# Patient Record
Sex: FEMALE | Race: BLACK OR AFRICAN AMERICAN
[De-identification: names, ages, dates, MRNs, and addresses within clinical notes are randomized per-mention and may not be internally consistent; named-entity substitution may affect disease eponyms.]

---

## 2017-01-18 ENCOUNTER — APPOINTMENT (OUTPATIENT)
Dept: NEPHROLOGY | Facility: CLINIC | Age: 77
End: 2017-01-18

## 2017-03-28 ENCOUNTER — APPOINTMENT (OUTPATIENT)
Dept: NEPHROLOGY | Facility: CLINIC | Age: 77
End: 2017-03-28

## 2017-11-13 ENCOUNTER — RESULT CHARGE (OUTPATIENT)
Age: 77
End: 2017-11-13

## 2017-11-14 ENCOUNTER — APPOINTMENT (OUTPATIENT)
Dept: HEART AND VASCULAR | Facility: CLINIC | Age: 77
End: 2017-11-14
Payer: MEDICARE

## 2017-11-14 VITALS
BODY MASS INDEX: 25.95 KG/M2 | WEIGHT: 151.99 LBS | OXYGEN SATURATION: 93 % | DIASTOLIC BLOOD PRESSURE: 70 MMHG | HEART RATE: 58 BPM | SYSTOLIC BLOOD PRESSURE: 128 MMHG | HEIGHT: 64 IN | TEMPERATURE: 97.6 F

## 2017-11-14 DIAGNOSIS — K21.9 GASTRO-ESOPHAGEAL REFLUX DISEASE W/OUT ESOPHAGITIS: ICD-10-CM

## 2017-11-14 PROCEDURE — 93000 ELECTROCARDIOGRAM COMPLETE: CPT

## 2017-11-14 PROCEDURE — 99214 OFFICE O/P EST MOD 30 MIN: CPT | Mod: 25

## 2017-11-15 ENCOUNTER — RX RENEWAL (OUTPATIENT)
Age: 77
End: 2017-11-15

## 2017-11-15 RX ORDER — ASPIRIN ENTERIC COATED TABLETS 81 MG 81 MG/1
81 TABLET, DELAYED RELEASE ORAL DAILY
Qty: 90 | Refills: 1 | Status: ACTIVE | COMMUNITY
Start: 2017-11-15 | End: 1900-01-01

## 2017-11-27 ENCOUNTER — RX RENEWAL (OUTPATIENT)
Age: 77
End: 2017-11-27

## 2017-12-15 ENCOUNTER — RX RENEWAL (OUTPATIENT)
Age: 77
End: 2017-12-15

## 2018-07-12 ENCOUNTER — APPOINTMENT (OUTPATIENT)
Dept: HEART AND VASCULAR | Facility: CLINIC | Age: 78
End: 2018-07-12

## 2018-07-19 ENCOUNTER — RX RENEWAL (OUTPATIENT)
Age: 78
End: 2018-07-19

## 2018-09-07 ENCOUNTER — RX RENEWAL (OUTPATIENT)
Age: 78
End: 2018-09-07

## 2018-11-01 ENCOUNTER — APPOINTMENT (OUTPATIENT)
Dept: HEART AND VASCULAR | Facility: CLINIC | Age: 78
End: 2018-11-01
Payer: MEDICARE

## 2018-11-01 VITALS
WEIGHT: 148 LBS | SYSTOLIC BLOOD PRESSURE: 140 MMHG | DIASTOLIC BLOOD PRESSURE: 70 MMHG | HEART RATE: 61 BPM | HEIGHT: 64 IN | OXYGEN SATURATION: 98 % | TEMPERATURE: 97.7 F | BODY MASS INDEX: 25.27 KG/M2

## 2018-11-01 PROCEDURE — 36415 COLL VENOUS BLD VENIPUNCTURE: CPT

## 2018-11-01 PROCEDURE — 93000 ELECTROCARDIOGRAM COMPLETE: CPT

## 2018-11-01 PROCEDURE — 99214 OFFICE O/P EST MOD 30 MIN: CPT

## 2018-11-02 LAB
ALBUMIN SERPL ELPH-MCNC: 4.4 G/DL
ALP BLD-CCNC: 64 U/L
ALT SERPL-CCNC: 14 U/L
ANION GAP SERPL CALC-SCNC: 14 MMOL/L
AST SERPL-CCNC: 22 U/L
BASOPHILS # BLD AUTO: 0.01 K/UL
BASOPHILS NFR BLD AUTO: 0.2 %
BILIRUB SERPL-MCNC: 0.4 MG/DL
BUN SERPL-MCNC: 34 MG/DL
CALCIUM SERPL-MCNC: 10.1 MG/DL
CHLORIDE SERPL-SCNC: 103 MMOL/L
CHOLEST SERPL-MCNC: 232 MG/DL
CHOLEST/HDLC SERPL: 2.8 RATIO
CO2 SERPL-SCNC: 28 MMOL/L
CREAT SERPL-MCNC: 1.53 MG/DL
EOSINOPHIL # BLD AUTO: 0.17 K/UL
EOSINOPHIL NFR BLD AUTO: 3.8 %
GLUCOSE SERPL-MCNC: 79 MG/DL
HBA1C MFR BLD HPLC: 5 %
HCT VFR BLD CALC: 35.5 %
HDLC SERPL-MCNC: 82 MG/DL
HGB BLD-MCNC: 11 G/DL
IMM GRANULOCYTES NFR BLD AUTO: 0.2 %
LDLC SERPL CALC-MCNC: 138 MG/DL
LYMPHOCYTES # BLD AUTO: 1.41 K/UL
LYMPHOCYTES NFR BLD AUTO: 31.3 %
MAN DIFF?: NORMAL
MCHC RBC-ENTMCNC: 31 GM/DL
MCHC RBC-ENTMCNC: 31.2 PG
MCV RBC AUTO: 100.6 FL
MONOCYTES # BLD AUTO: 0.38 K/UL
MONOCYTES NFR BLD AUTO: 8.4 %
NEUTROPHILS # BLD AUTO: 2.52 K/UL
NEUTROPHILS NFR BLD AUTO: 56.1 %
PLATELET # BLD AUTO: 245 K/UL
POTASSIUM SERPL-SCNC: 4.3 MMOL/L
PROT SERPL-MCNC: 6.8 G/DL
RBC # BLD: 3.53 M/UL
RBC # FLD: 14.1 %
SODIUM SERPL-SCNC: 145 MMOL/L
TRIGL SERPL-MCNC: 60 MG/DL
WBC # FLD AUTO: 4.5 K/UL

## 2019-01-08 ENCOUNTER — RX RENEWAL (OUTPATIENT)
Age: 79
End: 2019-01-08

## 2019-01-09 ENCOUNTER — RX RENEWAL (OUTPATIENT)
Age: 79
End: 2019-01-09

## 2019-01-30 ENCOUNTER — RX CHANGE (OUTPATIENT)
Age: 79
End: 2019-01-30

## 2019-11-04 ENCOUNTER — RX RENEWAL (OUTPATIENT)
Age: 79
End: 2019-11-04

## 2019-11-08 ENCOUNTER — APPOINTMENT (OUTPATIENT)
Dept: HEART AND VASCULAR | Facility: CLINIC | Age: 79
End: 2019-11-08
Payer: MEDICARE

## 2019-11-08 VITALS
SYSTOLIC BLOOD PRESSURE: 160 MMHG | HEART RATE: 62 BPM | DIASTOLIC BLOOD PRESSURE: 80 MMHG | BODY MASS INDEX: 25.75 KG/M2 | WEIGHT: 150 LBS | OXYGEN SATURATION: 98 % | TEMPERATURE: 97.6 F

## 2019-11-08 PROCEDURE — 36415 COLL VENOUS BLD VENIPUNCTURE: CPT

## 2019-11-08 PROCEDURE — 99214 OFFICE O/P EST MOD 30 MIN: CPT

## 2019-11-08 RX ORDER — IRBESARTAN AND HYDROCHLOROTHIAZIDE 300; 12.5 MG/1; MG/1
300-12.5 TABLET ORAL
Qty: 90 | Refills: 3 | Status: DISCONTINUED | COMMUNITY
End: 2019-11-08

## 2019-11-08 NOTE — HISTORY OF PRESENT ILLNESS
[FreeTextEntry1] : PCP- Dr Zavala - Cass Piña, [address], [address]\par Renal- Dr Tate\par GYN- Dr Barroso

## 2019-11-08 NOTE — REASON FOR VISIT
[FreeTextEntry1] : Last here 1 y/a\par 78 y/o F with ASHD- CCS 1317 95th %ile in 2014\par \par CRFs include HTN, DM, HLD and CKD.  Pt comes in very infrequently.\par \par EKG: Normal Sinus Rhythm with a 1st degree AVB,  lPVCs, ow voltage, Right Bundle Branch Block, nonspecific ST-T wave abnormalities 11/1/18

## 2019-11-08 NOTE — ASSESSMENT
[FreeTextEntry1] : ASHD-  CCS 1317, High CCS, last Nuc was 2013 which was WNL.  On ASA and statin. Labs drawn, flu shot refused.  Test to be updated.\par \par Ao V calcification- echo\par \par HTN- Stable\par \par DM- A1c 5.3\par \par CKD- Cr stable over the last 5 yrs, told to f/u with Dr Tate, did not go.  Will draw labs today\par \par RBBB- chronic\par \par

## 2019-11-08 NOTE — PHYSICAL EXAM
[General Appearance - Well Developed] : well developed [Normal Appearance] : normal appearance [Well Groomed] : well groomed [General Appearance - Well Nourished] : well nourished [No Deformities] : no deformities [General Appearance - In No Acute Distress] : no acute distress [Normal Conjunctiva] : the conjunctiva exhibited no abnormalities [Eyelids - No Xanthelasma] : the eyelids demonstrated no xanthelasmas [Normal Oral Mucosa] : normal oral mucosa [No Oral Cyanosis] : no oral cyanosis [No Oral Pallor] : no oral pallor [Respiration, Rhythm And Depth] : normal respiratory rhythm and effort [Exaggerated Use Of Accessory Muscles For Inspiration] : no accessory muscle use [Auscultation Breath Sounds / Voice Sounds] : lungs were clear to auscultation bilaterally [Heart Rate And Rhythm] : heart rate and rhythm were normal [Heart Sounds] : normal S1 and S2 [Murmurs] : no murmurs present [Abdomen Soft] : soft [Abdomen Mass (___ Cm)] : no abdominal mass palpated [Abdomen Tenderness] : non-tender [Gait - Sufficient For Exercise Testing] : the gait was sufficient for exercise testing [Abnormal Walk] : normal gait [Nail Clubbing] : no clubbing of the fingernails [Cyanosis, Localized] : no localized cyanosis [Skin Color & Pigmentation] : normal skin color and pigmentation [Petechial Hemorrhages (___cm)] : no petechial hemorrhages [] : no rash [Skin Lesions] : no skin lesions [No Venous Stasis] : no venous stasis [No Xanthoma] : no  xanthoma was observed [No Skin Ulcers] : no skin ulcer [Oriented To Time, Place, And Person] : oriented to person, place, and time [Mood] : the mood was normal [Affect] : the affect was normal [No Anxiety] : not feeling anxious [FreeTextEntry1] : 1/6 SHON

## 2019-11-10 LAB
24R-OH-CALCIDIOL SERPL-MCNC: 41.3 PG/ML
ALBUMIN SERPL ELPH-MCNC: 4.6 G/DL
ALP BLD-CCNC: 61 U/L
ALT SERPL-CCNC: 16 U/L
ANION GAP SERPL CALC-SCNC: 12 MMOL/L
AST SERPL-CCNC: 25 U/L
BASOPHILS # BLD AUTO: 0.02 K/UL
BASOPHILS NFR BLD AUTO: 0.4 %
BILIRUB SERPL-MCNC: 0.6 MG/DL
BUN SERPL-MCNC: 34 MG/DL
CALCIUM SERPL-MCNC: 10.1 MG/DL
CHLORIDE SERPL-SCNC: 103 MMOL/L
CHOLEST SERPL-MCNC: 202 MG/DL
CHOLEST/HDLC SERPL: 2.3 RATIO
CO2 SERPL-SCNC: 28 MMOL/L
CREAT SERPL-MCNC: 1.38 MG/DL
EOSINOPHIL # BLD AUTO: 0.15 K/UL
EOSINOPHIL NFR BLD AUTO: 3.1 %
ESTIMATED AVERAGE GLUCOSE: 108 MG/DL
FOLATE SERPL-MCNC: >20 NG/ML
GLUCOSE SERPL-MCNC: 78 MG/DL
HBA1C MFR BLD HPLC: 5.4 %
HCT VFR BLD CALC: 34.9 %
HDLC SERPL-MCNC: 88 MG/DL
HGB BLD-MCNC: 11 G/DL
IMM GRANULOCYTES NFR BLD AUTO: 0.2 %
IRON SATN MFR SERPL: 31 %
IRON SERPL-MCNC: 90 UG/DL
LDLC SERPL CALC-MCNC: 99 MG/DL
LYMPHOCYTES # BLD AUTO: 1.13 K/UL
LYMPHOCYTES NFR BLD AUTO: 23.5 %
MAN DIFF?: NORMAL
MCHC RBC-ENTMCNC: 31.5 GM/DL
MCHC RBC-ENTMCNC: 31.9 PG
MCV RBC AUTO: 101.2 FL
MONOCYTES # BLD AUTO: 0.38 K/UL
MONOCYTES NFR BLD AUTO: 7.9 %
NEUTROPHILS # BLD AUTO: 3.11 K/UL
NEUTROPHILS NFR BLD AUTO: 64.9 %
PLATELET # BLD AUTO: 237 K/UL
POTASSIUM SERPL-SCNC: 4.8 MMOL/L
PROT SERPL-MCNC: 7.1 G/DL
RBC # BLD: 3.45 M/UL
RBC # FLD: 13.2 %
SODIUM SERPL-SCNC: 143 MMOL/L
TIBC SERPL-MCNC: 291 UG/DL
TRIGL SERPL-MCNC: 77 MG/DL
UIBC SERPL-MCNC: 201 UG/DL
VIT B12 SERPL-MCNC: 557 PG/ML
WBC # FLD AUTO: 4.8 K/UL

## 2019-11-13 ENCOUNTER — LABORATORY RESULT (OUTPATIENT)
Age: 79
End: 2019-11-13

## 2019-11-14 ENCOUNTER — APPOINTMENT (OUTPATIENT)
Dept: INTERNAL MEDICINE | Facility: CLINIC | Age: 79
End: 2019-11-14
Payer: MEDICARE

## 2019-11-14 VITALS
DIASTOLIC BLOOD PRESSURE: 77 MMHG | SYSTOLIC BLOOD PRESSURE: 148 MMHG | HEIGHT: 64 IN | WEIGHT: 148 LBS | OXYGEN SATURATION: 99 % | TEMPERATURE: 98 F | BODY MASS INDEX: 25.27 KG/M2 | HEART RATE: 64 BPM

## 2019-11-14 VITALS — DIASTOLIC BLOOD PRESSURE: 72 MMHG | SYSTOLIC BLOOD PRESSURE: 136 MMHG

## 2019-11-14 DIAGNOSIS — Z87.891 PERSONAL HISTORY OF NICOTINE DEPENDENCE: ICD-10-CM

## 2019-11-14 DIAGNOSIS — Z80.9 FAMILY HISTORY OF MALIGNANT NEOPLASM, UNSPECIFIED: ICD-10-CM

## 2019-11-14 DIAGNOSIS — Z00.00 ENCOUNTER FOR GENERAL ADULT MEDICAL EXAMINATION W/OUT ABNORMAL FINDINGS: ICD-10-CM

## 2019-11-14 PROCEDURE — 36415 COLL VENOUS BLD VENIPUNCTURE: CPT

## 2019-11-14 PROCEDURE — G0439: CPT | Mod: 25

## 2019-11-16 LAB
25(OH)D3 SERPL-MCNC: 33.4 NG/ML
ALBUMIN MFR SERPL ELPH: 59.2 %
ALBUMIN SERPL-MCNC: 4.2 G/DL
ALBUMIN/GLOB SERPL: 1.4 RATIO
ALPHA1 GLOB MFR SERPL ELPH: 4 %
ALPHA1 GLOB SERPL ELPH-MCNC: 0.3 G/DL
ALPHA2 GLOB MFR SERPL ELPH: 8.3 %
ALPHA2 GLOB SERPL ELPH-MCNC: 0.6 G/DL
APPEARANCE: CLEAR
B-GLOBULIN MFR SERPL ELPH: 12.3 %
B-GLOBULIN SERPL ELPH-MCNC: 0.9 G/DL
BACTERIA: NEGATIVE
BILIRUBIN URINE: NEGATIVE
BLOOD URINE: NEGATIVE
CALCIUM SERPL-MCNC: 10.2 MG/DL
COLOR: NORMAL
DEPRECATED KAPPA LC FREE/LAMBDA SER: 1.45 RATIO
FERRITIN SERPL-MCNC: 251 NG/ML
GAMMA GLOB FLD ELPH-MCNC: 1.2 G/DL
GAMMA GLOB MFR SERPL ELPH: 16.2 %
GLUCOSE QUALITATIVE U: NEGATIVE
HBV CORE IGG+IGM SER QL: NONREACTIVE
HBV SURFACE AB SER QL: NONREACTIVE
HBV SURFACE AG SER QL: NONREACTIVE
HCV AB SER QL: NONREACTIVE
HCV S/CO RATIO: 0.26 S/CO
HEPATITIS A IGG ANTIBODY: REACTIVE
HIV1+2 AB SPEC QL IA.RAPID: NONREACTIVE
HYALINE CASTS: 0 /LPF
INTERPRETATION SERPL IEP-IMP: NORMAL
KAPPA LC CSF-MCNC: 2.24 MG/DL
KAPPA LC SERPL-MCNC: 3.25 MG/DL
KETONES URINE: NEGATIVE
LEUKOCYTE ESTERASE URINE: NEGATIVE
MICROSCOPIC-UA: NORMAL
NITRITE URINE: NEGATIVE
PARATHYROID HORMONE INTACT: 132 PG/ML
PH URINE: 5.5
PROT SERPL-MCNC: 7.1 G/DL
PROT SERPL-MCNC: 7.1 G/DL
PROTEIN URINE: NEGATIVE
RED BLOOD CELLS URINE: 1 /HPF
SPECIFIC GRAVITY URINE: 1.01
SQUAMOUS EPITHELIAL CELLS: 4 /HPF
T PALLIDUM AB SER QL IA: NEGATIVE
TSH SERPL-ACNC: 2.5 UIU/ML
UROBILINOGEN URINE: NORMAL
WHITE BLOOD CELLS URINE: 3 /HPF

## 2019-11-21 ENCOUNTER — TRANSCRIPTION ENCOUNTER (OUTPATIENT)
Age: 79
End: 2019-11-21

## 2019-11-26 DIAGNOSIS — Z12.39 ENCOUNTER FOR OTHER SCREENING FOR MALIGNANT NEOPLASM OF BREAST: ICD-10-CM

## 2019-12-10 ENCOUNTER — APPOINTMENT (OUTPATIENT)
Dept: NEPHROLOGY | Facility: CLINIC | Age: 79
End: 2019-12-10
Payer: MEDICARE

## 2019-12-10 VITALS — HEART RATE: 70 BPM | DIASTOLIC BLOOD PRESSURE: 70 MMHG | SYSTOLIC BLOOD PRESSURE: 148 MMHG

## 2019-12-10 DIAGNOSIS — E21.3 HYPERPARATHYROIDISM, UNSPECIFIED: ICD-10-CM

## 2019-12-10 PROCEDURE — 99204 OFFICE O/P NEW MOD 45 MIN: CPT

## 2019-12-10 NOTE — REASON FOR VISIT
[Initial Evaluation] : an initial evaluation [FreeTextEntry1] : for CKD 3, HTN, secondary hyperparathyroidism

## 2019-12-10 NOTE — PHYSICAL EXAM
[General Appearance - Alert] : alert [General Appearance - In No Acute Distress] : in no acute distress [Sclera] : the sclera and conjunctiva were normal [Extraocular Movements] : extraocular movements were intact [Outer Ear] : the ears and nose were normal in appearance [Examination Of The Oral Cavity] : the lips and gums were normal [Neck Appearance] : the appearance of the neck was normal [Auscultation Breath Sounds / Voice Sounds] : lungs were clear to auscultation bilaterally [Heart Rate And Rhythm] : heart rate was normal and rhythm regular [Heart Sounds] : normal S1 and S2 [Heart Sounds Gallop] : no gallops [Murmurs] : no murmurs [Heart Sounds Pericardial Friction Rub] : no pericardial rub [Edema] : there was no peripheral edema [Cervical Lymph Nodes Enlarged Anterior Bilaterally] : anterior cervical [Supraclavicular Lymph Nodes Enlarged Bilaterally] : supraclavicular [No CVA Tenderness] : no ~M costovertebral angle tenderness [No Spinal Tenderness] : no spinal tenderness [Abnormal Walk] : normal gait [No Focal Deficits] : no focal deficits [Oriented To Time, Place, And Person] : oriented to person, place, and time [Impaired Insight] : insight and judgment were intact [Affect] : the affect was normal [] : the neck was supple

## 2019-12-10 NOTE — HISTORY OF PRESENT ILLNESS
[FreeTextEntry1] : 80 yo F with CKD 3, HTN, HLD, hyperparathyroidism, returning for follow-up.  \par Doing well, last month she was taken off all DM medications as her A1C was 5.4.  \par Inquiring about dietary advice for kidney disease.  Trying to reduce number of times she eats out. Not doing too much physical activity.  \par Not checking BP at home, no monitor.  \par Not taking NSAIDs or PPIs\par Denies HA, CP, SOB, dizziness.\par No flank pain, dysuria, hematuria, frothy urine.

## 2019-12-10 NOTE — ASSESSMENT
[FreeTextEntry1] : reviewed lab results in detail with patient from EMR Nov 2019 \par renal US 2012: R 9.9 cm, L 10 cm; small simple cyst bilaterally, benign\par 80 yo woman with CKD3, HTN, HLD, and secondary hyperparathyroidism.\par -CKD 3 - Cr 1.38, stable. eGFR 42.  Instructed to limit protein intake to 8oz daily, likely doing that already as she eats meat only once daily.  Electrolytes good.  Pt decided she does not need to see a nutritionist yet.  continue to avoid NSAIDs.  \par -HTN - elevated today.  However need more data before considering medication changes. Instructed pt to obtain validated BP monitor and record readings twice daily for same relative week each month.  \par Encouraged increased activity such as doing more walking.  Continue efforts for reducing salt. \par -hyperlipidemia - stable c/w statin\par -secondary hyperparathyroidism - Ca and phos in acceptable ranges. continue to monitor.\par -anemia - hgb 11 stable.  elevated ferr noted.  No need for iron supplements at this time, monitor.\par Consider repeat colonoscopy screening due to +FHx of colon Ca (father).  Last done about 10yrs ago. \par \par f/u 3 months

## 2019-12-14 ENCOUNTER — TRANSCRIPTION ENCOUNTER (OUTPATIENT)
Age: 79
End: 2019-12-14

## 2020-03-10 ENCOUNTER — APPOINTMENT (OUTPATIENT)
Dept: NEPHROLOGY | Facility: CLINIC | Age: 80
End: 2020-03-10

## 2020-04-02 ENCOUNTER — APPOINTMENT (OUTPATIENT)
Dept: HEART AND VASCULAR | Facility: CLINIC | Age: 80
End: 2020-04-02

## 2020-11-29 ENCOUNTER — RX RENEWAL (OUTPATIENT)
Age: 80
End: 2020-11-29

## 2021-02-22 ENCOUNTER — RX RENEWAL (OUTPATIENT)
Age: 81
End: 2021-02-22

## 2021-03-22 ENCOUNTER — TRANSCRIPTION ENCOUNTER (OUTPATIENT)
Age: 81
End: 2021-03-22

## 2021-04-09 ENCOUNTER — APPOINTMENT (OUTPATIENT)
Dept: HEART AND VASCULAR | Facility: CLINIC | Age: 81
End: 2021-04-09

## 2021-04-09 NOTE — PHYSICAL EXAM
[General Appearance - Well Developed] : well developed [Normal Appearance] : normal appearance [Well Groomed] : well groomed [General Appearance - Well Nourished] : well nourished [No Deformities] : no deformities [General Appearance - In No Acute Distress] : no acute distress [Normal Conjunctiva] : the conjunctiva exhibited no abnormalities [Eyelids - No Xanthelasma] : the eyelids demonstrated no xanthelasmas [Normal Oral Mucosa] : normal oral mucosa [No Oral Pallor] : no oral pallor [No Oral Cyanosis] : no oral cyanosis [Respiration, Rhythm And Depth] : normal respiratory rhythm and effort [Exaggerated Use Of Accessory Muscles For Inspiration] : no accessory muscle use [Auscultation Breath Sounds / Voice Sounds] : lungs were clear to auscultation bilaterally [Heart Rate And Rhythm] : heart rate and rhythm were normal [Heart Sounds] : normal S1 and S2 [Murmurs] : no murmurs present [Abdomen Soft] : soft [Abdomen Tenderness] : non-tender [Abdomen Mass (___ Cm)] : no abdominal mass palpated [Abnormal Walk] : normal gait [Gait - Sufficient For Exercise Testing] : the gait was sufficient for exercise testing [Nail Clubbing] : no clubbing of the fingernails [Cyanosis, Localized] : no localized cyanosis [Petechial Hemorrhages (___cm)] : no petechial hemorrhages [Skin Color & Pigmentation] : normal skin color and pigmentation [] : no rash [No Venous Stasis] : no venous stasis [Skin Lesions] : no skin lesions [No Skin Ulcers] : no skin ulcer [No Xanthoma] : no  xanthoma was observed [Oriented To Time, Place, And Person] : oriented to person, place, and time [Affect] : the affect was normal [Mood] : the mood was normal [No Anxiety] : not feeling anxious [FreeTextEntry1] : 1/6 SHON

## 2021-04-09 NOTE — REASON FOR VISIT
[FreeTextEntry1] : Last here 1 y/a\par 76 y/o F with ASHD- CCS 1317 95th %ile in 2014\par \par CRFs include HTN, DM, HLD and CKD.  Pt comes in very infrequently.\par \par EKG: Normal Sinus Rhythm with a 1st degree AVB,  lPVCs, ow voltage, Right Bundle Branch Block, nonspecific ST-T wave abnormalities 11/1/18

## 2021-05-07 ENCOUNTER — APPOINTMENT (OUTPATIENT)
Dept: HEART AND VASCULAR | Facility: CLINIC | Age: 81
End: 2021-05-07

## 2021-05-07 DIAGNOSIS — I25.10 ATHEROSCLEROTIC HEART DISEASE OF NATIVE CORONARY ARTERY W/OUT ANGINA PECTORIS: ICD-10-CM

## 2021-05-07 NOTE — PHYSICAL EXAM
[General Appearance - Well Developed] : well developed [Normal Appearance] : normal appearance [Well Groomed] : well groomed [General Appearance - Well Nourished] : well nourished [No Deformities] : no deformities [General Appearance - In No Acute Distress] : no acute distress [Normal Conjunctiva] : the conjunctiva exhibited no abnormalities [Eyelids - No Xanthelasma] : the eyelids demonstrated no xanthelasmas [Normal Oral Mucosa] : normal oral mucosa [No Oral Pallor] : no oral pallor [No Oral Cyanosis] : no oral cyanosis [Respiration, Rhythm And Depth] : normal respiratory rhythm and effort [Exaggerated Use Of Accessory Muscles For Inspiration] : no accessory muscle use [Auscultation Breath Sounds / Voice Sounds] : lungs were clear to auscultation bilaterally [Heart Rate And Rhythm] : heart rate and rhythm were normal [Heart Sounds] : normal S1 and S2 [Murmurs] : no murmurs present [FreeTextEntry1] : 1/6 SHON [Abdomen Soft] : soft [Abdomen Tenderness] : non-tender [Abdomen Mass (___ Cm)] : no abdominal mass palpated [Abnormal Walk] : normal gait [Gait - Sufficient For Exercise Testing] : the gait was sufficient for exercise testing [Nail Clubbing] : no clubbing of the fingernails [Cyanosis, Localized] : no localized cyanosis [Petechial Hemorrhages (___cm)] : no petechial hemorrhages [Skin Color & Pigmentation] : normal skin color and pigmentation [] : no rash [No Venous Stasis] : no venous stasis [Skin Lesions] : no skin lesions [No Skin Ulcers] : no skin ulcer [No Xanthoma] : no  xanthoma was observed [Oriented To Time, Place, And Person] : oriented to person, place, and time [Affect] : the affect was normal [Mood] : the mood was normal [No Anxiety] : not feeling anxious

## 2021-05-07 NOTE — HISTORY OF PRESENT ILLNESS
[Home] : at home, [unfilled] , at the time of the visit. [Medical Office: ([facility])___] : at the medical office located in  [FreeTextEntry1] : PCP- Dr Zavala - Cass Piña, [address], [address] [place]\par Renal- Dr Tate\par GYN- Dr Barroso

## 2021-05-09 ENCOUNTER — RX RENEWAL (OUTPATIENT)
Age: 81
End: 2021-05-09

## 2021-05-17 ENCOUNTER — APPOINTMENT (OUTPATIENT)
Dept: HEART AND VASCULAR | Facility: CLINIC | Age: 81
End: 2021-05-17

## 2021-05-20 ENCOUNTER — APPOINTMENT (OUTPATIENT)
Dept: HEART AND VASCULAR | Facility: CLINIC | Age: 81
End: 2021-05-20
Payer: MEDICARE

## 2021-05-20 DIAGNOSIS — F51.04 PSYCHOPHYSIOLOGIC INSOMNIA: ICD-10-CM

## 2021-05-20 PROCEDURE — 99442: CPT

## 2021-05-20 NOTE — ASSESSMENT
[FreeTextEntry1] : ASHD-  CCS 1317, High CCS, last Nuc was 2013 which was WNL.  On ASA and statin. Labs drawn, flu shot refused.  Test to be updated.  LDL 89.  Agrees to get Covid vaccine\par \par Ao V calcification- echo\par \par HTN- Stable\par \par DM- A1c 5.3, 5.5\par \par CKD- Cr stable over the last 5 yrs, told to f/u with Dr Tate, did not go.   Cr 1.14 in March 2021\par \par Insomnia- Trial of Doxepin\par \par RBBB- chronic\par \par

## 2021-05-20 NOTE — HISTORY OF PRESENT ILLNESS
[FreeTextEntry1] : PCP- Dr Zavala - Cass Piña, [address], [address] [place], [place]\par Renal- Dr Tate\par GYN- Dr Barroso [Home] : at home, [unfilled] , at the time of the visit. [Medical Office: ([facility])___] : at the medical office located in

## 2021-05-26 DIAGNOSIS — F41.9 ANXIETY DISORDER, UNSPECIFIED: ICD-10-CM

## 2021-06-08 ENCOUNTER — APPOINTMENT (OUTPATIENT)
Age: 81
End: 2021-06-08
Payer: MEDICARE

## 2021-06-08 PROCEDURE — 0001A: CPT

## 2021-06-29 ENCOUNTER — APPOINTMENT (OUTPATIENT)
Age: 81
End: 2021-06-29
Payer: MEDICARE

## 2021-06-29 PROCEDURE — 0002A: CPT

## 2021-07-04 ENCOUNTER — RX RENEWAL (OUTPATIENT)
Age: 81
End: 2021-07-04

## 2021-07-06 ENCOUNTER — RX RENEWAL (OUTPATIENT)
Age: 81
End: 2021-07-06

## 2021-10-28 ENCOUNTER — NON-APPOINTMENT (OUTPATIENT)
Age: 81
End: 2021-10-28

## 2021-10-28 ENCOUNTER — APPOINTMENT (OUTPATIENT)
Dept: HEART AND VASCULAR | Facility: CLINIC | Age: 81
End: 2021-10-28
Payer: MEDICARE

## 2021-10-28 VITALS
HEIGHT: 64 IN | SYSTOLIC BLOOD PRESSURE: 141 MMHG | TEMPERATURE: 98 F | OXYGEN SATURATION: 98 % | BODY MASS INDEX: 23.22 KG/M2 | WEIGHT: 136 LBS | HEART RATE: 62 BPM | DIASTOLIC BLOOD PRESSURE: 65 MMHG

## 2021-10-28 VITALS — SYSTOLIC BLOOD PRESSURE: 132 MMHG | DIASTOLIC BLOOD PRESSURE: 64 MMHG

## 2021-10-28 PROCEDURE — 93000 ELECTROCARDIOGRAM COMPLETE: CPT

## 2021-10-28 PROCEDURE — 90670 PCV13 VACCINE IM: CPT

## 2021-10-28 PROCEDURE — G0009: CPT

## 2021-10-28 PROCEDURE — 99214 OFFICE O/P EST MOD 30 MIN: CPT | Mod: 25

## 2021-10-28 RX ORDER — PNEUMOCOCCAL 13-VALENT CONJUGATE VACCINE 2.2; 2.2; 2.2; 2.2; 2.2; 4.4; 2.2; 2.2; 2.2; 2.2; 2.2; 2.2; 2.2 UG/.5ML; UG/.5ML; UG/.5ML; UG/.5ML; UG/.5ML; UG/.5ML; UG/.5ML; UG/.5ML; UG/.5ML; UG/.5ML; UG/.5ML; UG/.5ML; UG/.5ML
INJECTION, SUSPENSION INTRAMUSCULAR
Qty: 1 | Refills: 0 | Status: DISCONTINUED | COMMUNITY
Start: 2021-10-28 | End: 2021-10-28

## 2021-10-28 NOTE — REASON FOR VISIT
[FreeTextEntry1] : 2019: Last here 1 y/a\par 82 y/o F with ASHD- CCS 1317 95th %ile in 2014\par \par CRFs include HTN, DM, HLD and CKD.  Pt comes in very infrequently.\par \par EKG: Normal Sinus Rhythm with a 1st degree AVB,  lPVCs, ow voltage, Right Bundle Branch Block, nonspecific ST-T wave abnormalities 11/1/18\par \par 5/20/21 We finally connected for a Telemed visit.  She reports intermittent ankle edema, most like from reduced activity and Norvasc.  She did manage it with elevation and support hose.  Her daughter bought her a peddle exerciser.  Needs Covid vaccine, agrees to get it.  Also with poor sleep.\par 10/28/21 Not sleeping well, consider trazodone

## 2021-10-28 NOTE — ASSESSMENT
[FreeTextEntry1] : ASHD-  CCS 1317, High CCS, last Nuc was  which was WNL.  On ASA and statin. Labs drawn, flu shot refused.  Test to be updated.  LDL 89.  Agrees to get Covid vaccine.  Had Pfizer x 2\par \par Ao V calcification- echo, still  not done\par \par HTN- Stable\par \par DM- A1c 5.3, 5.5\par \par CKD- Cr stable over the last 5 yrs, told to f/u with Dr Tate, did not go.   Cr 1.14 in 2021\par \par Insomnia- Trial of Doxepin, not covered, consider Trazodone, needs to exercise.  She deferrs Trazodone\par \par RBBB- chronic, also has HR 54 and 1st degree AVB, may need to reduce Toprol to 25.\par \par Health Maintainence- Mammogram  and scheduled, no GYN,  ? last colonoscopy. Prevnar 13 today 10/28/21, Pneumovax in 1 yr.  RTO 5 mos  Father had colon CA at age 70,  age 78\par

## 2021-10-28 NOTE — HISTORY OF PRESENT ILLNESS
[Home] : at home, [unfilled] , at the time of the visit. [Medical Office: ([facility])___] : at the medical office located in  [FreeTextEntry1] : PCP- Dr Zavala - Cass Piña, [address], [address] [place], [place]\par Renal- Dr Tate\par GYN- Dr Barroso

## 2021-11-30 ENCOUNTER — RX RENEWAL (OUTPATIENT)
Age: 81
End: 2021-11-30

## 2022-04-08 ENCOUNTER — APPOINTMENT (OUTPATIENT)
Dept: HEART AND VASCULAR | Facility: CLINIC | Age: 82
End: 2022-04-08
Payer: MEDICARE

## 2022-04-08 VITALS
DIASTOLIC BLOOD PRESSURE: 70 MMHG | OXYGEN SATURATION: 97 % | HEART RATE: 62 BPM | HEIGHT: 64 IN | BODY MASS INDEX: 23.39 KG/M2 | WEIGHT: 137 LBS | SYSTOLIC BLOOD PRESSURE: 129 MMHG

## 2022-04-08 PROCEDURE — 36415 COLL VENOUS BLD VENIPUNCTURE: CPT

## 2022-04-08 PROCEDURE — 99213 OFFICE O/P EST LOW 20 MIN: CPT

## 2022-04-08 NOTE — HISTORY OF PRESENT ILLNESS
[FreeTextEntry1] : PCP- Dr Zavala - Cass Piña, [address], [address] [place], [place]\par Renal- Dr Tate\par GYN- Dr Barroso

## 2022-04-08 NOTE — ASSESSMENT
[FreeTextEntry1] : ASHD-  CCS 1317, High CCS, last Nuc was  which was WNL.  On ASA and statin. Labs drawn, flu shot refused.  Test to be updated.  LDL 89.  Agrees to get Covid vaccine.  Had Pfizer x 2\par \par Ao V calcification- echo, still  not done\par \par HTN- Stable\par \par DM- A1c 5.3, 5.5.   Labs were drawn today in Office. \par \par CKD- Cr stable over the last 5 yrs, told to f/u with Dr Tate, did not go.   Cr 1.14 in 2021\par \par Insomnia- Trial of Doxepin, not covered, consider Trazodone, needs to exercise.  She defers Trazodone.  Tried Melatonin s success, try Benedryl\par \par RBBB- chronic, also has HR 54 and 1st degree AVB, may need to reduce Toprol to 25.\par \par Health Maintainence- Mammogram , no GYN,  ? last colonoscopy. Prevnar 13 today 10/28/21, Pneumovax given.  RTO 5 mos  Father had colon CA at age 70,  age 78.  Had Booster.  Recommend GI\par

## 2022-04-08 NOTE — REASON FOR VISIT
[FreeTextEntry1] : 2019: Last here 1 y/a\par 80 y/o F with ASHD- CCS 1317 95th %ile in 2014\par \par CRFs include HTN, DM, HLD and CKD.  Pt comes in very infrequently.\par \par EKG: Normal Sinus Rhythm with a 1st degree AVB,  lPVCs, ow voltage, Right Bundle Branch Block, nonspecific ST-T wave abnormalities 11/1/18\par \par 5/20/21 We finally connected for a Telemed visit.  She reports intermittent ankle edema, most like from reduced activity and Norvasc.  She did manage it with elevation and support hose.  Her daughter bought her a peddle exerciser.  Needs Covid vaccine, agrees to get it.  Also with poor sleep.\par 10/28/21 Not sleeping well, consider trazodone\par 4/8/22 Not sleeping well, tried Melatonin without success

## 2022-04-12 LAB
25(OH)D3 SERPL-MCNC: 32.3 NG/ML
ALBUMIN SERPL ELPH-MCNC: 4.3 G/DL
ALP BLD-CCNC: 69 U/L
ALT SERPL-CCNC: 16 U/L
ANION GAP SERPL CALC-SCNC: 14 MMOL/L
AST SERPL-CCNC: 23 U/L
BASOPHILS # BLD AUTO: 0.02 K/UL
BASOPHILS NFR BLD AUTO: 0.4 %
BILIRUB SERPL-MCNC: 0.6 MG/DL
BUN SERPL-MCNC: 30 MG/DL
CALCIUM SERPL-MCNC: 10.2 MG/DL
CHLORIDE SERPL-SCNC: 104 MMOL/L
CHOLEST SERPL-MCNC: 191 MG/DL
CO2 SERPL-SCNC: 27 MMOL/L
CREAT SERPL-MCNC: 1.3 MG/DL
EGFR: 41 ML/MIN/1.73M2
EOSINOPHIL # BLD AUTO: 0.16 K/UL
EOSINOPHIL NFR BLD AUTO: 3.2 %
ESTIMATED AVERAGE GLUCOSE: 117 MG/DL
GLUCOSE SERPL-MCNC: 81 MG/DL
HBA1C MFR BLD HPLC: 5.7 %
HCT VFR BLD CALC: 36.2 %
HDLC SERPL-MCNC: 87 MG/DL
HGB BLD-MCNC: 11.2 G/DL
IMM GRANULOCYTES NFR BLD AUTO: 0.2 %
LDLC SERPL CALC-MCNC: 92 MG/DL
LYMPHOCYTES # BLD AUTO: 1.48 K/UL
LYMPHOCYTES NFR BLD AUTO: 30 %
MAN DIFF?: NORMAL
MCHC RBC-ENTMCNC: 30.9 GM/DL
MCHC RBC-ENTMCNC: 31.4 PG
MCV RBC AUTO: 101.4 FL
MONOCYTES # BLD AUTO: 0.55 K/UL
MONOCYTES NFR BLD AUTO: 11.2 %
NEUTROPHILS # BLD AUTO: 2.71 K/UL
NEUTROPHILS NFR BLD AUTO: 55 %
NONHDLC SERPL-MCNC: 104 MG/DL
PLATELET # BLD AUTO: 207 K/UL
POTASSIUM SERPL-SCNC: 4.2 MMOL/L
PROT SERPL-MCNC: 7.1 G/DL
RBC # BLD: 3.57 M/UL
RBC # FLD: 13.5 %
SODIUM SERPL-SCNC: 145 MMOL/L
TRIGL SERPL-MCNC: 59 MG/DL
TSH SERPL-ACNC: 1.88 UIU/ML
WBC # FLD AUTO: 4.93 K/UL

## 2022-05-30 ENCOUNTER — RX RENEWAL (OUTPATIENT)
Age: 82
End: 2022-05-30

## 2023-01-20 ENCOUNTER — RX RENEWAL (OUTPATIENT)
Age: 83
End: 2023-01-20

## 2023-03-17 ENCOUNTER — APPOINTMENT (OUTPATIENT)
Dept: HEART AND VASCULAR | Facility: CLINIC | Age: 83
End: 2023-03-17

## 2023-07-19 ENCOUNTER — RX RENEWAL (OUTPATIENT)
Age: 83
End: 2023-07-19

## 2023-07-19 NOTE — OBJECTIVE
[History reviewed] : History reviewed. [Medications and Allergies reviewed] : Medications and allergies reviewed. 17.5

## 2023-08-08 ENCOUNTER — RX RENEWAL (OUTPATIENT)
Age: 83
End: 2023-08-08

## 2023-09-21 ENCOUNTER — NON-APPOINTMENT (OUTPATIENT)
Age: 83
End: 2023-09-21

## 2023-09-21 ENCOUNTER — APPOINTMENT (OUTPATIENT)
Dept: HEART AND VASCULAR | Facility: CLINIC | Age: 83
End: 2023-09-21
Payer: MEDICARE

## 2023-09-21 VITALS
OXYGEN SATURATION: 80 % | TEMPERATURE: 97.7 F | HEIGHT: 64 IN | WEIGHT: 141 LBS | HEART RATE: 65 BPM | BODY MASS INDEX: 24.07 KG/M2 | SYSTOLIC BLOOD PRESSURE: 142 MMHG | DIASTOLIC BLOOD PRESSURE: 68 MMHG

## 2023-09-21 PROCEDURE — 99214 OFFICE O/P EST MOD 30 MIN: CPT

## 2023-09-21 PROCEDURE — G0405: CPT

## 2023-09-21 PROCEDURE — 93000 ELECTROCARDIOGRAM COMPLETE: CPT

## 2023-10-17 ENCOUNTER — RX RENEWAL (OUTPATIENT)
Age: 83
End: 2023-10-17

## 2023-10-17 RX ORDER — CANDESARTAN CILEXETIL AND HYDROCHLOROTHIAZIDE 32; 12.5 MG/1; MG/1
32-12.5 TABLET ORAL
Qty: 90 | Refills: 3 | Status: ACTIVE | COMMUNITY
Start: 2019-01-30 | End: 1900-01-01

## 2023-10-30 ENCOUNTER — APPOINTMENT (OUTPATIENT)
Dept: HEART AND VASCULAR | Facility: CLINIC | Age: 83
End: 2023-10-30
Payer: MEDICARE

## 2023-10-30 DIAGNOSIS — D64.9 ANEMIA, UNSPECIFIED: ICD-10-CM

## 2023-10-30 PROCEDURE — 99442: CPT

## 2024-01-31 ENCOUNTER — RX RENEWAL (OUTPATIENT)
Age: 84
End: 2024-01-31

## 2024-05-06 ENCOUNTER — RX RENEWAL (OUTPATIENT)
Age: 84
End: 2024-05-06

## 2024-05-08 ENCOUNTER — APPOINTMENT (OUTPATIENT)
Dept: HEART AND VASCULAR | Facility: CLINIC | Age: 84
End: 2024-05-08
Payer: MEDICARE

## 2024-05-08 VITALS
HEART RATE: 63 BPM | OXYGEN SATURATION: 97 % | HEIGHT: 64 IN | SYSTOLIC BLOOD PRESSURE: 150 MMHG | TEMPERATURE: 98.1 F | BODY MASS INDEX: 24.24 KG/M2 | DIASTOLIC BLOOD PRESSURE: 70 MMHG | WEIGHT: 142 LBS

## 2024-05-08 VITALS — DIASTOLIC BLOOD PRESSURE: 60 MMHG | SYSTOLIC BLOOD PRESSURE: 150 MMHG

## 2024-05-08 DIAGNOSIS — E11.9 TYPE 2 DIABETES MELLITUS W/OUT COMPLICATIONS: ICD-10-CM

## 2024-05-08 DIAGNOSIS — E78.00 PURE HYPERCHOLESTEROLEMIA, UNSPECIFIED: ICD-10-CM

## 2024-05-08 DIAGNOSIS — N18.30 CHRONIC KIDNEY DISEASE, STAGE 3 UNSPECIFIED: ICD-10-CM

## 2024-05-08 DIAGNOSIS — I10 ESSENTIAL (PRIMARY) HYPERTENSION: ICD-10-CM

## 2024-05-08 PROCEDURE — 99212 OFFICE O/P EST SF 10 MIN: CPT

## 2024-05-08 NOTE — ASSESSMENT
[FreeTextEntry1] : ASHD- CCS 1317, High CCS, last Nuc was  which was WNL. On ASA and statin. Labs drawn, flu shot refused. Test to be updated. LDL 89. Agrees to get Covid vaccine. Had Pfizer  Ao V calcification- echo, still not done  Anemia- Stool card given, spoke to pt    10/30/23 and card not yet mailed in, no recent colonoscopy.  Last one was with Dr Papa Miranda. I stressed the need for expedited  f/u   HTN- Stable  DM- A1c 5.3, 5.5. 5.4.  Labs done in [place]  CKD- Cr stable over the last 5 yrs, told to f/u with Dr Tate, did not go. Cr 1.14 in 2021, 1.27  Insomnia- Trial of Doxepin, not covered, consider Trazodone, needs to exercise. She defers Trazodone. Tried Melatonin without success, try Benedryl  RBBB- chronic, also has HR 54 and 1st degree AVB, may need to reduce Toprol to 25.  Health Maintenance- Mammogram , no GYN, ? last colonoscopy. Prevnar 13 10/28/21, Pneumovax given. RTO 5 mos Father had colon CA at age 70,  age 78. Had Booster. Recommend GI. Anemia on labs 2023. Had Shingles shot.  Awaiting stool specimen, will need GI consult.  Vitamin reviewed.  Vit D, Ca++ and MVI ok.  Has new primary care team in [place].

## 2024-05-08 NOTE — REASON FOR VISIT
[FreeTextEntry1] : 2019: Last here 1 y/a 84y/o F with ASHD- CCS 1317 95th %ile in 2014  CRFs include HTN, DM, HLD and CKD.  Pt comes in very infrequently.  EKG: Normal Sinus Rhythm with a 1st degree AVB,  lPVCs, ow voltage, Right Bundle Branch Block, nonspecific ST-T wave abnormalities 11/1/18 5/20/21 We finally connected for a Telemed visit.  She reports intermittent ankle edema, most like from reduced activity and Norvasc.  She did manage it with elevation and support hose.  Her daughter bought her a peddle exerciser.  Needs Covid vaccine, agrees to get it.  Also with poor sleep. 10/28/21 Not sleeping well, consider trazodone 4/8/22 Not sleeping well, tried Melatonin without success 9/21/23 reports several falls.  Cr 1.27, A1c 5.4, H/H 11/31 10/30/23 Wants to discuss Vitamins.  Labs in Sept notable for a significant anemia.  She has  a card to check a stool specimen but has not sent it in yet.  I told her to move quickly and follow up.  No recent colonoscopy..  5/7/24 Going to [facility]. Carotid US ordered.

## 2024-05-08 NOTE — HISTORY OF PRESENT ILLNESS
[FreeTextEntry1] : PCP- Dr Zavala - Cass Piña, [address], [address] [place], [place], [facility].  Renal- Dr Tate GYN- Dr Barroso

## 2024-05-08 NOTE — PHYSICAL EXAM
[General Appearance - Well Developed] : well developed [Normal Appearance] : normal appearance [Well Groomed] : well groomed [General Appearance - Well Nourished] : well nourished [No Deformities] : no deformities [General Appearance - In No Acute Distress] : no acute distress [Normal Conjunctiva] : the conjunctiva exhibited no abnormalities [Eyelids - No Xanthelasma] : the eyelids demonstrated no xanthelasmas [Normal Oral Mucosa] : normal oral mucosa [No Oral Pallor] : no oral pallor [No Oral Cyanosis] : no oral cyanosis [Respiration, Rhythm And Depth] : normal respiratory rhythm and effort [Auscultation Breath Sounds / Voice Sounds] : lungs were clear to auscultation bilaterally [Exaggerated Use Of Accessory Muscles For Inspiration] : no accessory muscle use [Heart Rate And Rhythm] : heart rate and rhythm were normal [Heart Sounds] : normal S1 and S2 [Murmurs] : no murmurs present [Abdomen Soft] : soft [Abdomen Tenderness] : non-tender [Abdomen Mass (___ Cm)] : no abdominal mass palpated [Abnormal Walk] : normal gait [Gait - Sufficient For Exercise Testing] : the gait was sufficient for exercise testing [Nail Clubbing] : no clubbing of the fingernails [Cyanosis, Localized] : no localized cyanosis [Petechial Hemorrhages (___cm)] : no petechial hemorrhages [Skin Color & Pigmentation] : normal skin color and pigmentation [] : no rash [No Venous Stasis] : no venous stasis [Skin Lesions] : no skin lesions [No Skin Ulcers] : no skin ulcer [No Xanthoma] : no  xanthoma was observed [Oriented To Time, Place, And Person] : oriented to person, place, and time [Affect] : the affect was normal [Mood] : the mood was normal [No Anxiety] : not feeling anxious [FreeTextEntry1] : 1/6 SHON

## 2024-09-17 ENCOUNTER — TRANSCRIPTION ENCOUNTER (OUTPATIENT)
Age: 84
End: 2024-09-17

## 2024-10-31 ENCOUNTER — RX RENEWAL (OUTPATIENT)
Age: 84
End: 2024-10-31

## 2024-11-07 ENCOUNTER — APPOINTMENT (OUTPATIENT)
Dept: HEART AND VASCULAR | Facility: CLINIC | Age: 84
End: 2024-11-07

## 2024-11-27 ENCOUNTER — RX RENEWAL (OUTPATIENT)
Age: 84
End: 2024-11-27

## 2025-04-14 ENCOUNTER — APPOINTMENT (OUTPATIENT)
Dept: HEART AND VASCULAR | Facility: CLINIC | Age: 85
End: 2025-04-14
Payer: MEDICARE

## 2025-04-14 ENCOUNTER — NON-APPOINTMENT (OUTPATIENT)
Age: 85
End: 2025-04-14

## 2025-04-14 VITALS
HEIGHT: 64 IN | HEART RATE: 54 BPM | SYSTOLIC BLOOD PRESSURE: 150 MMHG | WEIGHT: 133 LBS | DIASTOLIC BLOOD PRESSURE: 60 MMHG | BODY MASS INDEX: 22.71 KG/M2 | OXYGEN SATURATION: 99 % | TEMPERATURE: 98 F

## 2025-04-14 DIAGNOSIS — N18.30 CHRONIC KIDNEY DISEASE, STAGE 3 UNSPECIFIED: ICD-10-CM

## 2025-04-14 DIAGNOSIS — I10 ESSENTIAL (PRIMARY) HYPERTENSION: ICD-10-CM

## 2025-04-14 DIAGNOSIS — E78.00 PURE HYPERCHOLESTEROLEMIA, UNSPECIFIED: ICD-10-CM

## 2025-04-14 DIAGNOSIS — E11.9 TYPE 2 DIABETES MELLITUS W/OUT COMPLICATIONS: ICD-10-CM

## 2025-04-14 DIAGNOSIS — I25.10 ATHEROSCLEROTIC HEART DISEASE OF NATIVE CORONARY ARTERY W/OUT ANGINA PECTORIS: ICD-10-CM

## 2025-04-14 PROCEDURE — 99214 OFFICE O/P EST MOD 30 MIN: CPT | Mod: 25

## 2025-04-14 PROCEDURE — 93000 ELECTROCARDIOGRAM COMPLETE: CPT

## 2025-04-14 RX ORDER — MAGNESIUM 200 MG
200 TABLET ORAL
Refills: 0 | Status: ACTIVE | COMMUNITY

## 2025-04-21 RX ORDER — EZETIMIBE 10 MG/1
10 TABLET ORAL
Qty: 90 | Refills: 1 | Status: ACTIVE | COMMUNITY
Start: 2025-04-18 | End: 1900-01-01

## 2025-05-03 ENCOUNTER — NON-APPOINTMENT (OUTPATIENT)
Age: 85
End: 2025-05-03

## 2025-05-05 ENCOUNTER — APPOINTMENT (OUTPATIENT)
Facility: CLINIC | Age: 85
End: 2025-05-05
Payer: MEDICARE

## 2025-05-05 VITALS — SYSTOLIC BLOOD PRESSURE: 125 MMHG | HEART RATE: 64 BPM | DIASTOLIC BLOOD PRESSURE: 69 MMHG | TEMPERATURE: 98.3 F

## 2025-05-05 DIAGNOSIS — F51.04 PSYCHOPHYSIOLOGIC INSOMNIA: ICD-10-CM

## 2025-05-05 DIAGNOSIS — Z51.81 ENCOUNTER FOR THERAPEUTIC DRUG LVL MONITORING: ICD-10-CM

## 2025-05-05 PROCEDURE — G2211 COMPLEX E/M VISIT ADD ON: CPT

## 2025-05-05 PROCEDURE — 99204 OFFICE O/P NEW MOD 45 MIN: CPT

## 2025-05-30 ENCOUNTER — APPOINTMENT (OUTPATIENT)
Dept: RADIOLOGY | Facility: CLINIC | Age: 85
End: 2025-05-30

## 2025-05-30 ENCOUNTER — OUTPATIENT (OUTPATIENT)
Dept: OUTPATIENT SERVICES | Facility: HOSPITAL | Age: 85
LOS: 1 days | End: 2025-05-30
Payer: MEDICARE

## 2025-05-30 PROCEDURE — 77085 DXA BONE DENSITY AXL VRT FX: CPT | Mod: 26

## 2025-06-02 DIAGNOSIS — M81.0 AGE-RELATED OSTEOPOROSIS W/OUT CURRENT PATHOLOGICAL FRACTURE: ICD-10-CM

## 2025-06-30 ENCOUNTER — APPOINTMENT (OUTPATIENT)
Dept: HEART AND VASCULAR | Facility: CLINIC | Age: 85
End: 2025-06-30
Payer: MEDICARE

## 2025-06-30 VITALS
OXYGEN SATURATION: 97 % | WEIGHT: 137 LBS | TEMPERATURE: 97.9 F | DIASTOLIC BLOOD PRESSURE: 62 MMHG | HEART RATE: 61 BPM | HEIGHT: 64 IN | SYSTOLIC BLOOD PRESSURE: 118 MMHG | BODY MASS INDEX: 23.39 KG/M2

## 2025-06-30 PROBLEM — I35.9 AORTIC VALVE CALCIFICATION: Status: ACTIVE | Noted: 2025-06-30

## 2025-06-30 PROCEDURE — 99213 OFFICE O/P EST LOW 20 MIN: CPT

## 2025-07-02 ENCOUNTER — TRANSCRIPTION ENCOUNTER (OUTPATIENT)
Age: 85
End: 2025-07-02

## 2025-09-06 ENCOUNTER — NON-APPOINTMENT (OUTPATIENT)
Age: 85
End: 2025-09-06

## 2025-09-08 ENCOUNTER — NON-APPOINTMENT (OUTPATIENT)
Age: 85
End: 2025-09-08